# Patient Record
Sex: FEMALE | Race: WHITE | Employment: UNEMPLOYED | URBAN - NONMETROPOLITAN AREA
[De-identification: names, ages, dates, MRNs, and addresses within clinical notes are randomized per-mention and may not be internally consistent; named-entity substitution may affect disease eponyms.]

---

## 2017-05-01 ENCOUNTER — OFFICE VISIT (OUTPATIENT)
Dept: FAMILY MEDICINE CLINIC | Facility: CLINIC | Age: 27
End: 2017-05-01

## 2017-05-01 ENCOUNTER — LAB ENCOUNTER (OUTPATIENT)
Dept: LAB | Age: 27
End: 2017-05-01
Attending: FAMILY MEDICINE
Payer: COMMERCIAL

## 2017-05-01 VITALS
BODY MASS INDEX: 21.14 KG/M2 | SYSTOLIC BLOOD PRESSURE: 110 MMHG | RESPIRATION RATE: 12 BRPM | DIASTOLIC BLOOD PRESSURE: 60 MMHG | TEMPERATURE: 98 F | HEART RATE: 60 BPM | WEIGHT: 125.38 LBS | HEIGHT: 64.5 IN

## 2017-05-01 DIAGNOSIS — M79.602 PARESTHESIA AND PAIN OF BOTH UPPER EXTREMITIES: ICD-10-CM

## 2017-05-01 DIAGNOSIS — R20.2 PARESTHESIA AND PAIN OF BOTH UPPER EXTREMITIES: ICD-10-CM

## 2017-05-01 DIAGNOSIS — M79.601 PARESTHESIA AND PAIN OF BOTH UPPER EXTREMITIES: ICD-10-CM

## 2017-05-01 DIAGNOSIS — Z00.00 ROUTINE HEALTH MAINTENANCE: ICD-10-CM

## 2017-05-01 DIAGNOSIS — Z00.00 ROUTINE HEALTH MAINTENANCE: Primary | ICD-10-CM

## 2017-05-01 PROCEDURE — 80053 COMPREHEN METABOLIC PANEL: CPT

## 2017-05-01 PROCEDURE — 36415 COLL VENOUS BLD VENIPUNCTURE: CPT

## 2017-05-01 PROCEDURE — 99395 PREV VISIT EST AGE 18-39: CPT | Performed by: FAMILY MEDICINE

## 2017-05-01 PROCEDURE — 98925 OSTEOPATH MANJ 1-2 REGIONS: CPT | Performed by: FAMILY MEDICINE

## 2017-05-01 PROCEDURE — 80061 LIPID PANEL: CPT

## 2017-05-01 PROCEDURE — 84443 ASSAY THYROID STIM HORMONE: CPT

## 2017-05-01 PROCEDURE — 85025 COMPLETE CBC W/AUTO DIFF WBC: CPT

## 2017-05-01 NOTE — PROGRESS NOTES
Aileen aHrrison is a 27year old female. Patient presents with:  CPX: No PAP - inrm 8      HPI:   Px  Complaints of bilateral hand numbness and discomfort at night. Without trauma.     Current Outpatient Prescriptions on File Prior to Visit:  [id number] Encounter  Comp Metabolic Panel (14) [E]  Lipid Panel [E]  TSH [E]  CBC W Differential W Platelet [E]    Meds & Refills for this Visit:  No prescriptions requested or ordered in this encounter    Imaging & Consults:  None

## 2017-05-01 NOTE — PATIENT INSTRUCTIONS
Breathing, massage thenar eminence and brachial radialis bilateral.  Call with laboratory studies. Follow-up with GYN as directed. Call with questions or problems.

## 2017-05-17 PROCEDURE — 88175 CYTOPATH C/V AUTO FLUID REDO: CPT | Performed by: OBSTETRICS & GYNECOLOGY

## 2017-05-17 PROCEDURE — 87491 CHLMYD TRACH DNA AMP PROBE: CPT | Performed by: OBSTETRICS & GYNECOLOGY

## 2017-05-17 PROCEDURE — 87591 N.GONORRHOEAE DNA AMP PROB: CPT | Performed by: OBSTETRICS & GYNECOLOGY

## 2017-07-19 ENCOUNTER — OFFICE VISIT (OUTPATIENT)
Dept: FAMILY MEDICINE CLINIC | Facility: CLINIC | Age: 27
End: 2017-07-19

## 2017-07-19 VITALS
DIASTOLIC BLOOD PRESSURE: 88 MMHG | TEMPERATURE: 98 F | SYSTOLIC BLOOD PRESSURE: 120 MMHG | OXYGEN SATURATION: 99 % | HEART RATE: 86 BPM

## 2017-07-19 DIAGNOSIS — G44.209 MUSCLE CONTRACTION HEADACHE: Primary | ICD-10-CM

## 2017-07-19 PROCEDURE — 99213 OFFICE O/P EST LOW 20 MIN: CPT | Performed by: FAMILY MEDICINE

## 2017-07-19 RX ORDER — CYCLOBENZAPRINE HCL 10 MG
10 TABLET ORAL 3 TIMES DAILY PRN
Qty: 15 TABLET | Refills: 0 | Status: SHIPPED | OUTPATIENT
Start: 2017-07-19 | End: 2017-08-08 | Stop reason: WASHOUT

## 2017-07-19 NOTE — PROGRESS NOTES
Aileen Harrison is a 24year old female. Patient presents with: Other: coughing up blood, cold/hot, very thirst, cannot eat--fatigued, headache that started 14 hours ago, not sleeping. ...room 1      HPI:   Patient has multiple complaints.   Her main concern t suspicious lesions  HEENT: atraumatic, normocephalic, R TM normal, L TM normal, Pharynx normal  NECK: supple, no cervical adenopathy  LUNGS: clear to auscultation  CARDIO: RRR without murmur  ABD soft, nontender, normal BS, no masses, rebound or guarding.

## 2017-10-04 ENCOUNTER — OFFICE VISIT (OUTPATIENT)
Dept: FAMILY MEDICINE CLINIC | Facility: CLINIC | Age: 27
End: 2017-10-04

## 2017-10-04 VITALS
HEIGHT: 63.75 IN | SYSTOLIC BLOOD PRESSURE: 110 MMHG | OXYGEN SATURATION: 98 % | BODY MASS INDEX: 21.43 KG/M2 | TEMPERATURE: 98 F | WEIGHT: 124 LBS | DIASTOLIC BLOOD PRESSURE: 60 MMHG | HEART RATE: 75 BPM

## 2017-10-04 DIAGNOSIS — L04.9 LYMPHADENITIS, ACUTE: Primary | ICD-10-CM

## 2017-10-04 DIAGNOSIS — R23.8 PIMPLES: ICD-10-CM

## 2017-10-04 PROCEDURE — 99213 OFFICE O/P EST LOW 20 MIN: CPT | Performed by: FAMILY MEDICINE

## 2017-10-04 NOTE — PROGRESS NOTES
HPI:    Patient ID: Christa Wagner is a 24year old female. Pt last night lump R upper shoulder / neck  + pimple  W/o other problems  HPI    Review of Systems   Constitutional: Negative for chills and fever.             Current Outpatient Prescriptions:  Medr

## 2018-03-26 ENCOUNTER — OFFICE VISIT (OUTPATIENT)
Dept: FAMILY MEDICINE CLINIC | Facility: CLINIC | Age: 28
End: 2018-03-26

## 2018-03-26 VITALS
WEIGHT: 125.38 LBS | HEIGHT: 64.5 IN | SYSTOLIC BLOOD PRESSURE: 104 MMHG | DIASTOLIC BLOOD PRESSURE: 60 MMHG | BODY MASS INDEX: 21.14 KG/M2 | TEMPERATURE: 98 F

## 2018-03-26 DIAGNOSIS — J32.9 SINUSITIS, UNSPECIFIED CHRONICITY, UNSPECIFIED LOCATION: Primary | ICD-10-CM

## 2018-03-26 PROCEDURE — 99213 OFFICE O/P EST LOW 20 MIN: CPT | Performed by: FAMILY MEDICINE

## 2018-03-26 RX ORDER — AZITHROMYCIN 250 MG/1
TABLET, FILM COATED ORAL
Qty: 6 TABLET | Refills: 0 | Status: SHIPPED | OUTPATIENT
Start: 2018-03-26 | End: 2018-05-04 | Stop reason: ALTCHOICE

## 2018-03-26 RX ORDER — PREDNISONE 20 MG/1
20 TABLET ORAL 2 TIMES DAILY
Qty: 10 TABLET | Refills: 0 | Status: SHIPPED | OUTPATIENT
Start: 2018-03-26 | End: 2018-04-02

## 2018-03-26 NOTE — PATIENT INSTRUCTIONS
REST,FLUIDS,ADVIL / TYLENOL PRN fever / body aches  CALL NO CHANGE WORSENING  DISCUSSED WARNING SIGNS  F/U No change 2-3 days  Humidifier / advil C+S

## 2018-03-26 NOTE — PROGRESS NOTES
HPI:    Patient ID: Christa Wagner is a 24year old female. Cough yest  + ST 1 wk  + mj  + fever    HPI    Review of Systems   Constitutional: Positive for chills and fever.    HENT: Positive for congestion, postnasal drip, rhinorrhea, sinus pressure and so

## 2019-03-13 ENCOUNTER — OFFICE VISIT (OUTPATIENT)
Dept: FAMILY MEDICINE CLINIC | Facility: CLINIC | Age: 29
End: 2019-03-13
Payer: COMMERCIAL

## 2019-03-13 VITALS
BODY MASS INDEX: 22.6 KG/M2 | HEIGHT: 64.5 IN | WEIGHT: 134 LBS | SYSTOLIC BLOOD PRESSURE: 116 MMHG | DIASTOLIC BLOOD PRESSURE: 78 MMHG | HEART RATE: 78 BPM | TEMPERATURE: 98 F | OXYGEN SATURATION: 99 %

## 2019-03-13 DIAGNOSIS — J32.9 SINUSITIS, UNSPECIFIED CHRONICITY, UNSPECIFIED LOCATION: Primary | ICD-10-CM

## 2019-03-13 PROCEDURE — 99213 OFFICE O/P EST LOW 20 MIN: CPT | Performed by: FAMILY MEDICINE

## 2019-03-13 RX ORDER — AZITHROMYCIN 250 MG/1
TABLET, FILM COATED ORAL
Qty: 6 TABLET | Refills: 0 | Status: SHIPPED | OUTPATIENT
Start: 2019-03-13 | End: 2019-04-30

## 2019-03-13 RX ORDER — PREDNISONE 20 MG/1
20 TABLET ORAL 2 TIMES DAILY
Qty: 10 TABLET | Refills: 0 | Status: SHIPPED | OUTPATIENT
Start: 2019-03-13 | End: 2019-03-18

## 2019-03-13 NOTE — PROGRESS NOTES
HPI:    Patient ID: Alex Hardin is a 34year old female. X 4 days  + headache / mj / ST  W/o fever  HPI    Review of Systems   Constitutional: Positive for fatigue. Negative for chills and fever.    HENT: Positive for congestion, sinus pressure and sinus MG Oral Tab 10 tablet 0     Sig: Take 1 tablet (20 mg total) by mouth 2 (two) times daily for 5 days. • azithromycin (ZITHROMAX Z-GUERO) 250 MG Oral Tab 6 tablet 0     Sig: Take two tablets by mouth today, then one tablet daily.        Imaging & Referrals:

## 2019-03-13 NOTE — PATIENT INSTRUCTIONS
REST,FLUIDS,ADVIL / TYLENOL PRN fever / body aches  CALL NO CHANGE WORSENING  DISCUSSED WARNING SIGNS  F/U No change 2-3 days  advil C+S

## 2019-06-14 ENCOUNTER — OFFICE VISIT (OUTPATIENT)
Dept: FAMILY MEDICINE CLINIC | Facility: CLINIC | Age: 29
End: 2019-06-14
Payer: COMMERCIAL

## 2019-06-14 VITALS
HEART RATE: 86 BPM | WEIGHT: 136 LBS | OXYGEN SATURATION: 99 % | DIASTOLIC BLOOD PRESSURE: 64 MMHG | SYSTOLIC BLOOD PRESSURE: 104 MMHG | BODY MASS INDEX: 23 KG/M2 | TEMPERATURE: 97 F

## 2019-06-14 DIAGNOSIS — N89.8 VAGINAL DISCHARGE: ICD-10-CM

## 2019-06-14 DIAGNOSIS — N89.8 VAGINAL ITCHING: ICD-10-CM

## 2019-06-14 DIAGNOSIS — B37.3 VULVOVAGINAL CANDIDIASIS: Primary | ICD-10-CM

## 2019-06-14 PROCEDURE — 99213 OFFICE O/P EST LOW 20 MIN: CPT | Performed by: NURSE PRACTITIONER

## 2019-06-14 PROCEDURE — 87491 CHLMYD TRACH DNA AMP PROBE: CPT | Performed by: NURSE PRACTITIONER

## 2019-06-14 PROCEDURE — 87591 N.GONORRHOEAE DNA AMP PROB: CPT | Performed by: NURSE PRACTITIONER

## 2019-06-14 RX ORDER — FLUCONAZOLE 150 MG/1
TABLET ORAL
Qty: 2 TABLET | Refills: 0 | Status: SHIPPED | OUTPATIENT
Start: 2019-06-14 | End: 2019-07-03 | Stop reason: ALTCHOICE

## 2019-06-14 NOTE — PROGRESS NOTES
HPI:   Yeast Infection   The patient's primary symptoms include genital itching, pelvic pain and vaginal discharge (yellow, chunky). The patient's pertinent negatives include no genital lesions, genital odor or genital rash. Episode onset: 3 days ago.  Pe Cardiovascular: Normal rate, regular rhythm and normal heart sounds. Pulmonary/Chest: Effort normal and breath sounds normal. She has no wheezes. Genitourinary: Uterus normal. There is no rash or tenderness on the right labia.  There is no rash or te

## 2019-07-03 ENCOUNTER — TELEPHONE (OUTPATIENT)
Dept: FAMILY MEDICINE CLINIC | Facility: CLINIC | Age: 29
End: 2019-07-03

## 2019-07-03 ENCOUNTER — OFFICE VISIT (OUTPATIENT)
Dept: FAMILY MEDICINE CLINIC | Facility: CLINIC | Age: 29
End: 2019-07-03
Payer: COMMERCIAL

## 2019-07-03 VITALS
BODY MASS INDEX: 22.77 KG/M2 | SYSTOLIC BLOOD PRESSURE: 110 MMHG | WEIGHT: 135 LBS | HEIGHT: 64.5 IN | TEMPERATURE: 97 F | DIASTOLIC BLOOD PRESSURE: 60 MMHG | RESPIRATION RATE: 12 BRPM | HEART RATE: 80 BPM

## 2019-07-03 DIAGNOSIS — S90.111A CONTUSION OF RIGHT GREAT TOE WITHOUT DAMAGE TO NAIL, INITIAL ENCOUNTER: Primary | ICD-10-CM

## 2019-07-03 PROCEDURE — 99213 OFFICE O/P EST LOW 20 MIN: CPT | Performed by: FAMILY MEDICINE

## 2019-07-03 NOTE — TELEPHONE ENCOUNTER
Patient states on Saturday she dropped a glass bottle on her great toe (the bottle did not break). After that she had swelling and bruising. Since then the bruising has resolved. Now complains of palpable bump and pain with palpation only.  No pain weightbe

## 2019-07-03 NOTE — PROGRESS NOTES
HPI:    Patient ID: Tyler Schwab is a 34year old female. Sat dropped beer on R big toe. Intially swelling / bruising  Able to move w/o difficulty. w/o limp    HPI    Review of Systems           Current Outpatient Medications:  [facility] [address]-

## 2019-07-30 ENCOUNTER — TELEPHONE (OUTPATIENT)
Dept: FAMILY MEDICINE CLINIC | Facility: CLINIC | Age: 29
End: 2019-07-30

## 2019-07-30 DIAGNOSIS — B37.3 VULVOVAGINAL CANDIDIASIS: Primary | ICD-10-CM

## 2019-07-30 RX ORDER — FLUCONAZOLE 200 MG/1
TABLET ORAL
Qty: 2 TABLET | Refills: 0 | Status: SHIPPED | OUTPATIENT
Start: 2019-07-30 | End: 2019-09-05

## 2019-07-30 NOTE — TELEPHONE ENCOUNTER
Is very concerned that she has yeast infection symptoms again, within 1 month of each episode. Has vaginal discharge and itching for 2 days, \"just like before\"    Needs to know what is going on, why this is happening. She needs some answers.   She saw n

## 2019-07-30 NOTE — TELEPHONE ENCOUNTER
YEAST INFECTION FOR 2 DAYS. SHE HAD ONE A MONTH AGO. WANTS TO SPEAK WITH NURSE, CONCERNED IT IS REOCCURRING.  PLEASE CALL

## 2019-07-30 NOTE — TELEPHONE ENCOUNTER
Patient instructed; diflucan ordered. She will keep appt tomorrow to discuss further with Dr Alana Avila.

## 2019-07-30 NOTE — TELEPHONE ENCOUNTER
Cotton underwear  Urine after intercourse  Probiotics daily  Stay out of sweaty under garments   F/u if cont problems

## 2019-07-31 ENCOUNTER — OFFICE VISIT (OUTPATIENT)
Dept: FAMILY MEDICINE CLINIC | Facility: CLINIC | Age: 29
End: 2019-07-31
Payer: COMMERCIAL

## 2019-07-31 VITALS
SYSTOLIC BLOOD PRESSURE: 98 MMHG | OXYGEN SATURATION: 97 % | BODY MASS INDEX: 23.22 KG/M2 | HEIGHT: 64 IN | RESPIRATION RATE: 16 BRPM | HEART RATE: 77 BPM | WEIGHT: 136 LBS | TEMPERATURE: 98 F | DIASTOLIC BLOOD PRESSURE: 76 MMHG

## 2019-07-31 DIAGNOSIS — F43.29 STRESS AND ADJUSTMENT REACTION: ICD-10-CM

## 2019-07-31 DIAGNOSIS — B37.3 VULVOVAGINAL CANDIDIASIS: Primary | ICD-10-CM

## 2019-07-31 DIAGNOSIS — G44.219 FREQUENT EPISODIC TENSION-TYPE HEADACHE: ICD-10-CM

## 2019-07-31 PROCEDURE — 99214 OFFICE O/P EST MOD 30 MIN: CPT | Performed by: FAMILY MEDICINE

## 2019-07-31 NOTE — PATIENT INSTRUCTIONS
Massage jaw / temples - advil / aleve  Stress modification  meds as directed - no sweaty clothing / cotton underwear / probiotic daily  Recurrent - GYNE  Call ?  Or problems

## 2019-07-31 NOTE — PROGRESS NOTES
HPI:    Patient ID: Ricci Elena is a 34year old female. Pt vaginal discharge  Increase stress  Sweating at work  Headaches - freq  bandlike    HPI    Review of Systems   Constitutional: Negative for chills and fever.    HENT: Negative for congestion,

## 2019-09-05 ENCOUNTER — TELEPHONE (OUTPATIENT)
Dept: FAMILY MEDICINE CLINIC | Facility: CLINIC | Age: 29
End: 2019-09-05

## 2019-09-05 DIAGNOSIS — B37.3 VULVOVAGINAL CANDIDIASIS: ICD-10-CM

## 2019-09-05 RX ORDER — FLUCONAZOLE 200 MG/1
TABLET ORAL
Qty: 2 TABLET | Refills: 0 | Status: SHIPPED | OUTPATIENT
Start: 2019-09-05 | End: 2020-09-25 | Stop reason: ALTCHOICE

## 2020-10-02 PROBLEM — R87.613 HGSIL (HIGH GRADE SQUAMOUS INTRAEPITHELIAL LESION) ON PAP SMEAR OF CERVIX: Status: ACTIVE | Noted: 2020-10-02

## 2020-10-30 PROCEDURE — 88305 TISSUE EXAM BY PATHOLOGIST: CPT | Performed by: OBSTETRICS & GYNECOLOGY

## 2020-10-30 PROCEDURE — 88342 IMHCHEM/IMCYTCHM 1ST ANTB: CPT | Performed by: OBSTETRICS & GYNECOLOGY

## 2020-11-02 ENCOUNTER — TELEPHONE (OUTPATIENT)
Dept: FAMILY MEDICINE CLINIC | Facility: CLINIC | Age: 30
End: 2020-11-02

## 2020-11-02 NOTE — TELEPHONE ENCOUNTER
Patient asking for appt with Dr Ernestina Marcus, tomorrow OK, to get evaluation on \"migraine\" headache.  shehas been getting them for a long time. Last one from today about 1 month ago.   Not related to period, she is on \"heavy duty birthcontrol,\"  Only has 1 p

## 2020-11-03 ENCOUNTER — OFFICE VISIT (OUTPATIENT)
Dept: FAMILY MEDICINE CLINIC | Facility: CLINIC | Age: 30
End: 2020-11-03
Payer: COMMERCIAL

## 2020-11-03 VITALS
RESPIRATION RATE: 16 BRPM | SYSTOLIC BLOOD PRESSURE: 120 MMHG | HEART RATE: 80 BPM | BODY MASS INDEX: 23.73 KG/M2 | DIASTOLIC BLOOD PRESSURE: 68 MMHG | TEMPERATURE: 98 F | WEIGHT: 139 LBS | HEIGHT: 64 IN

## 2020-11-03 DIAGNOSIS — G44.219 FREQUENT EPISODIC TENSION-TYPE HEADACHE: Primary | ICD-10-CM

## 2020-11-03 DIAGNOSIS — F43.29 STRESS AND ADJUSTMENT REACTION: ICD-10-CM

## 2020-11-03 DIAGNOSIS — M54.2 NECK PAIN: ICD-10-CM

## 2020-11-03 PROCEDURE — 98926 OSTEOPATH MANJ 3-4 REGIONS: CPT | Performed by: FAMILY MEDICINE

## 2020-11-03 PROCEDURE — 99072 ADDL SUPL MATRL&STAF TM PHE: CPT | Performed by: FAMILY MEDICINE

## 2020-11-03 PROCEDURE — 3078F DIAST BP <80 MM HG: CPT | Performed by: FAMILY MEDICINE

## 2020-11-03 PROCEDURE — 3074F SYST BP LT 130 MM HG: CPT | Performed by: FAMILY MEDICINE

## 2020-11-03 PROCEDURE — 99214 OFFICE O/P EST MOD 30 MIN: CPT | Performed by: FAMILY MEDICINE

## 2020-11-03 PROCEDURE — 3008F BODY MASS INDEX DOCD: CPT | Performed by: FAMILY MEDICINE

## 2020-11-03 RX ORDER — BUTALBITAL, ACETAMINOPHEN AND CAFFEINE 50; 325; 40 MG/1; MG/1; MG/1
1 TABLET ORAL EVERY 4 HOURS PRN
Qty: 30 TABLET | Refills: 0 | Status: SHIPPED | OUTPATIENT
Start: 2020-11-03

## 2020-11-03 RX ORDER — AMITRIPTYLINE HYDROCHLORIDE 25 MG/1
25 TABLET, FILM COATED ORAL NIGHTLY
Qty: 30 TABLET | Refills: 1 | Status: SHIPPED | OUTPATIENT
Start: 2020-11-03 | End: 2021-01-25

## 2020-11-03 NOTE — PROGRESS NOTES
HPI:    Patient ID: German Calderon is a 31year old female. Worsened migraines this yr / occas aura  Episodes q 6 wks  No pattern  W/o grinding  Sleep - OK  Stress high  Top of head  + vomiting associated with bad headaches.   HPI    Review of Systems   C adjustment reaction    No orders of the defined types were placed in this encounter.       Meds This Visit:  Requested Prescriptions     Signed Prescriptions Disp Refills   • Amitriptyline HCl 25 MG Oral Tab 30 tablet 1     Sig: Take 1 tablet (25 mg total)

## 2020-11-20 PROCEDURE — 88307 TISSUE EXAM BY PATHOLOGIST: CPT | Performed by: OBSTETRICS & GYNECOLOGY

## 2020-11-20 PROCEDURE — 88305 TISSUE EXAM BY PATHOLOGIST: CPT | Performed by: OBSTETRICS & GYNECOLOGY

## 2021-01-25 ENCOUNTER — OFFICE VISIT (OUTPATIENT)
Dept: FAMILY MEDICINE CLINIC | Facility: CLINIC | Age: 31
End: 2021-01-25
Payer: COMMERCIAL

## 2021-01-25 VITALS
DIASTOLIC BLOOD PRESSURE: 70 MMHG | TEMPERATURE: 99 F | WEIGHT: 142 LBS | BODY MASS INDEX: 24.24 KG/M2 | RESPIRATION RATE: 16 BRPM | SYSTOLIC BLOOD PRESSURE: 130 MMHG | HEIGHT: 64 IN

## 2021-01-25 DIAGNOSIS — M54.2 NECK PAIN: ICD-10-CM

## 2021-01-25 DIAGNOSIS — G44.219 FREQUENT EPISODIC TENSION-TYPE HEADACHE: ICD-10-CM

## 2021-01-25 DIAGNOSIS — F43.29 STRESS AND ADJUSTMENT REACTION: ICD-10-CM

## 2021-01-25 DIAGNOSIS — S29.012A STRAIN OF MUSCLE AND TENDON OF BACK WALL OF THORAX, INITIAL ENCOUNTER: Primary | ICD-10-CM

## 2021-01-25 PROCEDURE — 99214 OFFICE O/P EST MOD 30 MIN: CPT | Performed by: FAMILY MEDICINE

## 2021-01-25 PROCEDURE — 3078F DIAST BP <80 MM HG: CPT | Performed by: FAMILY MEDICINE

## 2021-01-25 PROCEDURE — 3075F SYST BP GE 130 - 139MM HG: CPT | Performed by: FAMILY MEDICINE

## 2021-01-25 PROCEDURE — 3008F BODY MASS INDEX DOCD: CPT | Performed by: FAMILY MEDICINE

## 2021-01-25 RX ORDER — AMITRIPTYLINE HYDROCHLORIDE 25 MG/1
12.5 TABLET, FILM COATED ORAL NIGHTLY
Qty: 30 TABLET | Refills: 1 | Status: SHIPPED | OUTPATIENT
Start: 2021-01-25 | End: 2021-07-23

## 2021-01-25 NOTE — PROGRESS NOTES
HPI:    Patient ID: Rosaline Gitelman is a 31year old female. X Friday shoulder pain  Post R shoulder blade  W/o trauma  Neck tight  Stress high work / career    HPI    Review of Systems   Respiratory: Negative for cough and shortness of breath.     Cardiov Stress and adjustment reaction    No orders of the defined types were placed in this encounter.       Meds This Visit:  Requested Prescriptions     Signed Prescriptions Disp Refills   • Amitriptyline HCl 25 MG Oral Tab 30 tablet 1     Sig: Take 0.5 tablets

## 2021-02-12 ENCOUNTER — TELEPHONE (OUTPATIENT)
Dept: FAMILY MEDICINE CLINIC | Facility: CLINIC | Age: 31
End: 2021-02-12

## 2021-02-12 DIAGNOSIS — R51.9 NONINTRACTABLE HEADACHE, UNSPECIFIED CHRONICITY PATTERN, UNSPECIFIED HEADACHE TYPE: ICD-10-CM

## 2021-02-12 DIAGNOSIS — S29.012A STRAIN OF MUSCLE AND TENDON OF BACK WALL OF THORAX, INITIAL ENCOUNTER: Primary | ICD-10-CM

## 2021-02-12 RX ORDER — CYCLOBENZAPRINE HCL 10 MG
10 TABLET ORAL 3 TIMES DAILY PRN
Qty: 30 TABLET | Refills: 0 | Status: SHIPPED | OUTPATIENT
Start: 2021-02-12 | End: 2021-03-04

## 2021-02-12 NOTE — TELEPHONE ENCOUNTER
Patient has bad headache and unable to move right shoulder. Wanting appt for today. Has missed to much work. Scheduled appt for 8 am 2/15/2021. Instructed to take aleve 2 tab po BID, use ice, and Dr Rayo Madrid is ordering a muscle relaxant for her.   jai/thien

## 2021-02-12 NOTE — TELEPHONE ENCOUNTER
SHE WOKE UP THIS MORNING AND SHE CAN'T MOVE HER ARM.  SHE IS ASKING TO SEE DR Mireya Simental AND SHE IS CRYING

## 2021-02-15 ENCOUNTER — OFFICE VISIT (OUTPATIENT)
Dept: FAMILY MEDICINE CLINIC | Facility: CLINIC | Age: 31
End: 2021-02-15
Payer: COMMERCIAL

## 2021-02-15 VITALS
DIASTOLIC BLOOD PRESSURE: 80 MMHG | BODY MASS INDEX: 23.9 KG/M2 | HEIGHT: 64 IN | SYSTOLIC BLOOD PRESSURE: 110 MMHG | RESPIRATION RATE: 16 BRPM | TEMPERATURE: 98 F | OXYGEN SATURATION: 98 % | HEART RATE: 76 BPM | WEIGHT: 140 LBS

## 2021-02-15 DIAGNOSIS — M25.511 ACUTE PAIN OF RIGHT SHOULDER: ICD-10-CM

## 2021-02-15 DIAGNOSIS — G44.219 FREQUENT EPISODIC TENSION-TYPE HEADACHE: Primary | ICD-10-CM

## 2021-02-15 DIAGNOSIS — M54.2 NECK PAIN: ICD-10-CM

## 2021-02-15 PROCEDURE — 3008F BODY MASS INDEX DOCD: CPT | Performed by: FAMILY MEDICINE

## 2021-02-15 PROCEDURE — 3079F DIAST BP 80-89 MM HG: CPT | Performed by: FAMILY MEDICINE

## 2021-02-15 PROCEDURE — 99214 OFFICE O/P EST MOD 30 MIN: CPT | Performed by: FAMILY MEDICINE

## 2021-02-15 PROCEDURE — 3074F SYST BP LT 130 MM HG: CPT | Performed by: FAMILY MEDICINE

## 2021-02-15 PROCEDURE — 98925 OSTEOPATH MANJ 1-2 REGIONS: CPT | Performed by: FAMILY MEDICINE

## 2021-02-15 RX ORDER — MELOXICAM 15 MG/1
15 TABLET ORAL DAILY
Qty: 15 TABLET | Refills: 3 | Status: SHIPPED | OUTPATIENT
Start: 2021-02-15

## 2021-02-15 NOTE — PROGRESS NOTES
HPI:    Patient ID: Emmy Reeves is a 31year old female. Headache thurs  R shoulder pain  Went to ER  + stress w/ work  More freq headaches    HPI    Review of Systems   Gastrointestinal: Negative. Genitourinary: Negative.     Musculoskeletal: Posit /80   Pulse 76   Temp 97.6 °F (36.4 °C) (Temporal)   Resp 16   Ht 5' 4\" (1.626 m)   Wt 140 lb (63.5 kg)   SpO2 98%   BMI 24.03 kg/m²              ASSESSMENT/PLAN:   Neck pain  Acute pain of right shoulder  Frequent episodic tension-type headache  (p

## 2021-02-15 NOTE — PATIENT INSTRUCTIONS
rowing exersise,alt-ice/heat,avoid neck exersises  CORE STRENGTHENING  Discussed neutral neck position

## 2021-03-01 ENCOUNTER — TELEPHONE (OUTPATIENT)
Dept: FAMILY MEDICINE CLINIC | Facility: CLINIC | Age: 31
End: 2021-03-01

## 2021-03-01 NOTE — TELEPHONE ENCOUNTER
She said she is still waiting for a call from you letting her know that she can now call to schedule her pt appointment

## 2021-03-04 ENCOUNTER — TELEPHONE (OUTPATIENT)
Dept: PHYSICAL THERAPY | Facility: HOSPITAL | Age: 31
End: 2021-03-04

## 2021-03-08 ENCOUNTER — OFFICE VISIT (OUTPATIENT)
Dept: PHYSICAL THERAPY | Age: 31
End: 2021-03-08
Attending: FAMILY MEDICINE
Payer: COMMERCIAL

## 2021-03-08 DIAGNOSIS — G44.219 FREQUENT EPISODIC TENSION-TYPE HEADACHE: ICD-10-CM

## 2021-03-08 DIAGNOSIS — M54.2 NECK PAIN: ICD-10-CM

## 2021-03-08 DIAGNOSIS — M25.511 ACUTE PAIN OF RIGHT SHOULDER: ICD-10-CM

## 2021-03-08 PROCEDURE — 97110 THERAPEUTIC EXERCISES: CPT

## 2021-03-08 PROCEDURE — 97162 PT EVAL MOD COMPLEX 30 MIN: CPT

## 2021-03-09 NOTE — PROGRESS NOTES
CERVICAL SPINE EVALUATION:   Referring Provider: Jass Hanks DO  [address] [place], [place] [address]   Diagnosis: Neck pain (M54.2); Acute pain of right shoulder (M25.511);  Frequent episodic tension-type headache (G44.219)     Date of Service cervical hypomobility leading to headaches at least partially cervicogenic in nature.   Throughout session, patient reported she did not feel physical therapy was needed; we discussed at length the effective role PT + HEP can play in reducing, eliminating, and outcome measures, this evaluation involved Moderate Complexity decision making due to 1-2 personal factors/comorbidities, 3 body structures involved/activity limitations, and evolving symptoms including progressively worsening symptoms with multiple re required: Yes  I certify the need for these services furnished under this plan of treatment and while under my care.     X___________________________________________________ Date____________________    Certification From: 6/9/9285  To: 6/6/2021

## 2021-03-15 ENCOUNTER — OFFICE VISIT (OUTPATIENT)
Dept: PHYSICAL THERAPY | Age: 31
End: 2021-03-15
Attending: FAMILY MEDICINE
Payer: COMMERCIAL

## 2021-03-15 PROCEDURE — 97140 MANUAL THERAPY 1/> REGIONS: CPT

## 2021-03-15 PROCEDURE — 97110 THERAPEUTIC EXERCISES: CPT

## 2021-03-15 NOTE — PROGRESS NOTES
Dx: Neck pain (M54.2); Acute pain of right shoulder (M25.511);  Frequent episodic tension-type headache (G44.219)          Insurance (Authorized # of Visits):  [id number] Provider: Dr. Krzysztof Bosch MD/DO/PA/NP visit: None scheduled   Pre Tx#: 5/ Date:    Tx#: 6/   Roberto x15'  Prone P-As (central R unilateral) grade II-III       TherEx  Supine cervical rotations x10  Supine B scap retract, depression x10ea  Supine chin tucks (no lift) x10/5\" hold  Supine HABD 3x10/YTB  UT stretch wi

## 2021-03-22 ENCOUNTER — OFFICE VISIT (OUTPATIENT)
Dept: PHYSICAL THERAPY | Age: 31
End: 2021-03-22
Attending: FAMILY MEDICINE
Payer: COMMERCIAL

## 2021-03-22 PROCEDURE — 97140 MANUAL THERAPY 1/> REGIONS: CPT

## 2021-03-22 PROCEDURE — 97110 THERAPEUTIC EXERCISES: CPT

## 2021-03-22 NOTE — PROGRESS NOTES
Dx: Neck pain (M54.2); Acute pain of right shoulder (M25.511);  Frequent episodic tension-type headache (G44.219)          Insurance (Authorized # of Visits):  [id number] Provider: Dr. Saeid Bosch MD/DO/PA/NP visit: None scheduled   Pre of 16 visits over a 90 day period    Date: 3/15/2021  Tx#: 2/16 Date: 3/22/2021             Tx#: 3/16 Date: Tx#: 4/ Date:    Tx#: 5/   ManTher x15'  Prone P-As (central R unilateral) grade II-III ManTher x15'  Prone P-As (central R unilatera

## 2021-04-02 ENCOUNTER — OFFICE VISIT (OUTPATIENT)
Dept: PHYSICAL THERAPY | Age: 31
End: 2021-04-02
Attending: FAMILY MEDICINE
Payer: COMMERCIAL

## 2021-04-02 PROCEDURE — 97140 MANUAL THERAPY 1/> REGIONS: CPT

## 2021-04-02 PROCEDURE — 97110 THERAPEUTIC EXERCISES: CPT

## 2021-04-02 NOTE — PROGRESS NOTES
Dx: Neck pain (M54.2); Acute pain of right shoulder (M25.511);  Frequent episodic tension-type headache (G44.219)          Insurance (Authorized # of Visits):  [facility] Provider: Dr. Marge Bosch MD/DO/PA/NP visit: None scheduled  Voca turning head to check blind spot while driving  · Pt will report decreased frequency of headaches to <5x/week  --  GOAL MET (4/2/2021)  · Pt will demonstrate improved cervical intrinsic strength to allow improved cervical stabilization with lifting >30# as Treatment Time: 40 min

## 2021-04-09 ENCOUNTER — OFFICE VISIT (OUTPATIENT)
Dept: PHYSICAL THERAPY | Age: 31
End: 2021-04-09
Attending: FAMILY MEDICINE
Payer: COMMERCIAL

## 2021-04-09 PROCEDURE — 97110 THERAPEUTIC EXERCISES: CPT

## 2021-04-09 PROCEDURE — 97140 MANUAL THERAPY 1/> REGIONS: CPT

## 2021-04-09 NOTE — PROGRESS NOTES
Dx: Neck pain (M54.2); Acute pain of right shoulder (M25.511);  Frequent episodic tension-type headache (G44.219)          Insurance (Authorized # of Visits):  [facility] Provider: Dr. Jasmine Bosch MD/DO/PA/NP visit: None scheduled  Voca extension ROM to improve tolerance for putting dishes into overhead cabinets  --  GOAL MET (4/2/2021)  · Pt will demonstrate full, pain-free B rotation ROM to improve tolerance for turning head to check blind spot while driving  · Pt will report decreased rotation x15  Isometric cervical extension into ball x10/3\" hold  RTB rows, HABD, extension x10ea  Median nerve slider x10 TherEx  UBE 3'/3'  Supine cervical rotations x10  Supine B scap retract, depression x10ea  Supine chin tucks (no lift) 3x30\"  UT st

## 2021-04-12 ENCOUNTER — OFFICE VISIT (OUTPATIENT)
Dept: PHYSICAL THERAPY | Age: 31
End: 2021-04-12
Attending: FAMILY MEDICINE
Payer: COMMERCIAL

## 2021-04-12 PROCEDURE — 97110 THERAPEUTIC EXERCISES: CPT

## 2021-04-12 PROCEDURE — 97140 MANUAL THERAPY 1/> REGIONS: CPT

## 2021-04-12 NOTE — PROGRESS NOTES
Dx: Neck pain (M54.2); Acute pain of right shoulder (M25.511);  Frequent episodic tension-type headache (G44.219)          Insurance (Authorized # of Visits):  [facility] Provider: Dr. Brenda Bosch MD/DO/PA/NP visit: None scheduled  Voca full, pain-free B rotation ROM to improve tolerance for turning head to check blind spot while driving  · Pt will report decreased frequency of headaches to <5x/week  --  GOAL MET (4/2/2021)  · Pt will demonstrate improved cervical intrinsic strength to al 3-finger rotation x15  Isometric cervical extension into ball x10/3\" hold  RTB rows, HABD, extension x10ea  Median nerve slider x10 TherEx  UBE 3'/3'  Supine cervical rotations x10  Supine B scap retract, depression x10ea  Supine chin tucks (no lift) 3x

## 2021-04-16 ENCOUNTER — OFFICE VISIT (OUTPATIENT)
Dept: PHYSICAL THERAPY | Age: 31
End: 2021-04-16
Attending: FAMILY MEDICINE
Payer: COMMERCIAL

## 2021-04-16 PROCEDURE — 97140 MANUAL THERAPY 1/> REGIONS: CPT

## 2021-04-16 PROCEDURE — 97110 THERAPEUTIC EXERCISES: CPT

## 2021-04-16 NOTE — PROGRESS NOTES
Dx: Neck pain (M54.2); Acute pain of right shoulder (M25.511);  Frequent episodic tension-type headache (G44.219)          Insurance (Authorized # of Visits):  [facility] Provider: Dr. Zuri Bosch MD/DO/PA/NP visit: None scheduled  Voca pain-free B rotation ROM to improve tolerance for turning head to check blind spot while driving  · Pt will report decreased frequency of headaches to <5x/week  --  GOAL MET (4/2/2021)  · Pt will demonstrate improved cervical intrinsic strength to allow im extension into ball x10/3\" hold  RTB rows, HABD, extension x10ea  Median nerve slider x10 TherEx  UBE 3'/3'  Supine cervical rotations x10  Supine B scap retract, depression x10ea  Supine chin tucks (no lift) 3x30\"  Supine chin tuck + lift 3x10\"  UT str

## 2021-04-19 ENCOUNTER — OFFICE VISIT (OUTPATIENT)
Dept: PHYSICAL THERAPY | Age: 31
End: 2021-04-19
Attending: FAMILY MEDICINE
Payer: COMMERCIAL

## 2021-04-19 PROCEDURE — 97110 THERAPEUTIC EXERCISES: CPT

## 2021-04-19 PROCEDURE — 97140 MANUAL THERAPY 1/> REGIONS: CPT

## 2021-04-19 NOTE — PROGRESS NOTES
Dx: Neck pain (M54.2); Acute pain of right shoulder (M25.511);  Frequent episodic tension-type headache (G44.219)          Insurance (Authorized # of Visits):  [facility] Provider: Dr. Zuri Bosch MD/DO/PA/NP visit: None scheduled  Voca will demonstrate full, pain-free B rotation ROM to improve tolerance for turning head to check blind spot while driving  · Pt will report decreased frequency of headaches to <5x/week  --  GOAL MET (4/2/2021)  · Pt will demonstrate improved cervical intrins x10ea  Supine chin tucks (no lift) 3x30\"  Supine chin tuck + lift 3x10\"  UT stretch with flexion bias 3x30\" for R UT only   3-finger rotation x15  Isometric cervical extension into ball x10/3\" hold  RTB rows, HABD, extension x10ea  Median nerve slider

## 2021-04-23 ENCOUNTER — OFFICE VISIT (OUTPATIENT)
Dept: PHYSICAL THERAPY | Age: 31
End: 2021-04-23
Attending: FAMILY MEDICINE
Payer: COMMERCIAL

## 2021-04-23 PROCEDURE — 97110 THERAPEUTIC EXERCISES: CPT

## 2021-04-23 PROCEDURE — 97140 MANUAL THERAPY 1/> REGIONS: CPT

## 2021-04-23 NOTE — PROGRESS NOTES
Dx: Neck pain (M54.2); Acute pain of right shoulder (M25.511);  Frequent episodic tension-type headache (G44.219)          Insurance (Authorized # of Visits):  [facility] Provider: Dr. Ernestina Bosch MD/DO/PA/NP visit: None scheduled  Voca work  · Pt will be independent and compliant with comprehensive HEP to maintain progress achieved in PT     FREQUENCY: Patient will be seen for 2 x/week or a total of 16 visits over a 90 day period    Date: 4/2/2021              Tx#: 4/16 Date: 4/9/2021  T into ball x10/3\" hold  RTB rows, HABD, extension x10ea  Median nerve slider x10 TherEx  UBE 3'/3'  Supine cervical rotations x10  Supine B scap retract, depression x10ea  Supine chin tucks (no lift) 3x30\"  Supine chin tuck + lift 3x10\"  UT stretch with

## 2021-05-17 ENCOUNTER — APPOINTMENT (OUTPATIENT)
Dept: PHYSICAL THERAPY | Age: 31
End: 2021-05-17
Attending: FAMILY MEDICINE
Payer: COMMERCIAL

## 2021-05-20 ENCOUNTER — APPOINTMENT (OUTPATIENT)
Dept: PHYSICAL THERAPY | Age: 31
End: 2021-05-20
Attending: FAMILY MEDICINE
Payer: COMMERCIAL

## 2021-05-24 ENCOUNTER — OFFICE VISIT (OUTPATIENT)
Dept: PHYSICAL THERAPY | Age: 31
End: 2021-05-24
Attending: FAMILY MEDICINE
Payer: COMMERCIAL

## 2021-05-24 PROCEDURE — 97140 MANUAL THERAPY 1/> REGIONS: CPT

## 2021-05-24 PROCEDURE — 97110 THERAPEUTIC EXERCISES: CPT

## 2021-05-24 NOTE — PROGRESS NOTES
Dx: Neck pain (M54.2); Acute pain of right shoulder (M25.511);  Frequent episodic tension-type headache (G44.219)          Insurance (Authorized # of Visits):  Latrelle Moritz (3/8/21 - 4/6/21)           Authorizing Provider: Dr. Krzysztof Bosch MD/ visit: None blair strength to allow improved cervical stabilization with lifting >30# as needed for work  · Pt will be independent and compliant with comprehensive HEP to maintain progress achieved in PT     FREQUENCY: Patient will be seen for 2 x/week or a total of 16 visi TherEx  UBE 2:30/2:30  Prone scap setting x10  Supine cervical rotations x10  Supine B scap retract, depression x10ea  Supine chin tucks (no lift) 3x30\"  Supine chin tuck + lift 3x20\"  UT stretch with flexion bias 3x30\" for R UT only   3-finger rotation

## 2021-05-27 ENCOUNTER — APPOINTMENT (OUTPATIENT)
Dept: PHYSICAL THERAPY | Age: 31
End: 2021-05-27
Attending: FAMILY MEDICINE
Payer: COMMERCIAL

## 2021-06-01 ENCOUNTER — OFFICE VISIT (OUTPATIENT)
Dept: PHYSICAL THERAPY | Age: 31
End: 2021-06-01
Attending: FAMILY MEDICINE
Payer: COMMERCIAL

## 2021-06-01 PROCEDURE — 97140 MANUAL THERAPY 1/> REGIONS: CPT

## 2021-06-01 PROCEDURE — 97110 THERAPEUTIC EXERCISES: CPT

## 2021-06-01 NOTE — PROGRESS NOTES
ProgressSummary / POC Re-certification  Pt has attended 11 visits in Physical Therapy. Dx: Neck pain (M54.2); Acute pain of right shoulder (M25.511);  Frequent episodic tension-type headache (G44.219)          Insurance (Authorized # of Visits):  [place] check blind spot while driving  --  GOAL MET (5/24/2021)  · Pt will report decreased frequency of headaches to <5x/week  --  GOAL MET (4/2/2021)  · Pt will demonstrate improved cervical intrinsic strength to allow improved cervical stabilization with lifti 3x30\"  Supine chin tuck + lift 3x10\"  UT stretch with flexion bias 3x30\" for R UT only   3-finger rotation x15  Isometric cervical extension into ball w/scap retraction x10/3\" hold  GTB rows, HABD, extension x10ea  Median nerve slider x10 TherEx  UBE 3 mobs; UT stretch; T-band rows  Charges: Roberto x1; TherEx x2       Total Timed Treatment: 40 min  Total Treatment Time: 40 min

## 2021-06-03 ENCOUNTER — OFFICE VISIT (OUTPATIENT)
Dept: PHYSICAL THERAPY | Age: 31
End: 2021-06-03
Attending: FAMILY MEDICINE
Payer: COMMERCIAL

## 2021-06-03 PROCEDURE — 97110 THERAPEUTIC EXERCISES: CPT

## 2021-06-03 PROCEDURE — 97140 MANUAL THERAPY 1/> REGIONS: CPT

## 2021-06-03 NOTE — PROGRESS NOTES
Dx: Neck pain (M54.2); Acute pain of right shoulder (M25.511);  Frequent episodic tension-type headache (G44.219)          Insurance (Authorized # of Visits):  Therese Combs (6/1/21 - 8/30/21)          Authorizing Provider: Dr. Jacinto Bosch MD/ visit: None blair cervical intrinsic strength to allow improved cervical stabilization with lifting >30# as needed for work  · Pt will be independent and compliant with comprehensive HEP to maintain progress achieved in PT     FREQUENCY: Patient will be seen for 2 x/week or Supine chin tuck; supine cervical rotations; median nerve slider; self-thoracic mobs; UT stretch; T-band rows  Charges: Lindaher x1; TherEx x2       Total Timed Treatment: 40 min  Total Treatment Time: 40 min

## 2021-06-07 ENCOUNTER — OFFICE VISIT (OUTPATIENT)
Dept: PHYSICAL THERAPY | Age: 31
End: 2021-06-07
Attending: FAMILY MEDICINE
Payer: COMMERCIAL

## 2021-06-07 PROCEDURE — 97140 MANUAL THERAPY 1/> REGIONS: CPT

## 2021-06-07 PROCEDURE — 97110 THERAPEUTIC EXERCISES: CPT

## 2021-06-07 NOTE — PROGRESS NOTES
Dx: Neck pain (M54.2); Acute pain of right shoulder (M25.511);  Frequent episodic tension-type headache (G44.219)          Insurance (Authorized # of Visits):  Gregory Abad (6/1/21 - 8/30/21)          Authorizing Provider: Dr. Noa Bosch MD/DO visit: None blair decreased frequency of headaches to <5x/week  --  GOAL MET (4/2/2021)  · Pt will demonstrate improved cervical intrinsic strength to allow improved cervical stabilization with lifting >30# as needed for work  · Pt will be independent and compliant with com 2x10ea  RTB HABD 2x10  RTB ER walk-outs x10  BTB IR walk-outs x10  SA punches into ball x10/3\" hold  Median nerve tensioner x10  Standing UTR x10 R/L with BTB, head still TherEx  UBE 2:30/2:30  S/L ER 3x15/0#  Supine B scap retract, depression x10ea  Supi

## 2021-06-10 ENCOUNTER — OFFICE VISIT (OUTPATIENT)
Dept: PHYSICAL THERAPY | Age: 31
End: 2021-06-10
Attending: FAMILY MEDICINE
Payer: COMMERCIAL

## 2021-06-10 PROCEDURE — 97140 MANUAL THERAPY 1/> REGIONS: CPT

## 2021-06-10 PROCEDURE — 97110 THERAPEUTIC EXERCISES: CPT

## 2021-06-10 NOTE — PROGRESS NOTES
Dx: Neck pain (M54.2); Acute pain of right shoulder (M25.511);  Frequent episodic tension-type headache (G44.219)          Insurance (Authorized # of Visits):  Cruz Jarquin (6/1/21 - 8/30/21)          Authorizing Provider: Dr. Corazon Bosch MD/DO visit: None blair stabilization with lifting >30# as needed for work  · Pt will be independent and compliant with comprehensive HEP to maintain progress achieved in PT     FREQUENCY: Patient will be seen for 2 x/week or a total of 16 visits over a 90 day period.     Date: 5/ x10/3\" hold  Median nerve tensioner x10  Standing UTR x10 R/L with BTB, head still TherEx  UBE 2:30/2:30  S/L ER 3x15/0#  Supine B scap retract, depression x10ea  Supine chin tuck + lift 3x25\"  Posterior cuff stretch 3x30\"  Single-arm alternating LT lif

## 2021-06-25 ENCOUNTER — OFFICE VISIT (OUTPATIENT)
Dept: PHYSICAL THERAPY | Age: 31
End: 2021-06-25
Attending: FAMILY MEDICINE
Payer: COMMERCIAL

## 2021-06-25 PROCEDURE — 97140 MANUAL THERAPY 1/> REGIONS: CPT

## 2021-06-25 PROCEDURE — 97110 THERAPEUTIC EXERCISES: CPT

## 2021-06-25 NOTE — PROGRESS NOTES
Dx: Neck pain (M54.2); Acute pain of right shoulder (M25.511);  Frequent episodic tension-type headache (G44.219)          Insurance (Authorized # of Visits):  Vioelt Yeager (6/1/21 - 8/30/21)          Authorizing Provider: Dr. Alana Bosch MD/DO visit: None blair report decreased frequency of headaches to <5x/week  --  GOAL MET (4/2/2021)  · Pt will demonstrate improved cervical intrinsic strength to allow improved cervical stabilization with lifting >30# as needed for work  · Pt will be independent and compliant w 3x10ea  GTB HABD 2x10  RTB ER walk-outs x10  BTB IR walk-outs x10  SA punches into ball x10/3\" hold     HEP: 6/25/2021: Supine chin tuck; supine cervical rotations; self-thoracic mobs; SCM stretch; T-band rows  Charges: ManTher x1; TherEx x2       Total T

## 2021-07-02 ENCOUNTER — APPOINTMENT (OUTPATIENT)
Dept: PHYSICAL THERAPY | Age: 31
End: 2021-07-02
Attending: FAMILY MEDICINE
Payer: COMMERCIAL

## 2021-07-08 ENCOUNTER — TELEPHONE (OUTPATIENT)
Dept: PHYSICAL THERAPY | Age: 31
End: 2021-07-08

## 2021-07-08 ENCOUNTER — APPOINTMENT (OUTPATIENT)
Dept: PHYSICAL THERAPY | Age: 31
End: 2021-07-08
Attending: FAMILY MEDICINE
Payer: COMMERCIAL

## 2021-07-16 ENCOUNTER — OFFICE VISIT (OUTPATIENT)
Dept: PHYSICAL THERAPY | Age: 31
End: 2021-07-16
Attending: FAMILY MEDICINE
Payer: COMMERCIAL

## 2021-07-16 PROCEDURE — 97110 THERAPEUTIC EXERCISES: CPT

## 2021-07-16 PROCEDURE — 97140 MANUAL THERAPY 1/> REGIONS: CPT

## 2021-07-16 NOTE — PROGRESS NOTES
ProgressSummary  Pt has attended 16 visits in Physical Therapy. Dx: Neck pain (M54.2); Acute pain of right shoulder (M25.511);  Frequent episodic tension-type headache (G44.219)          Insurance (Authorized # of Visits):  Canelo Alvarez (6/1/21 - 8/30/21) pain-free cervical extension ROM to improve tolerance for putting dishes into overhead cabinets  --  GOAL MET (4/2/2021)  · Pt will demonstrate full, pain-free B rotation ROM to improve tolerance for turning head to check blind spot while driving  --  GOAL 3x30\"  Single-arm alternating LT lift-off x10  GTB rows, tameka ER, extension 2x10ea  GTB HABD 2x10  RTB ER walk-outs x10  BTB IR walk-outs x10  SA punches into ball x10/3\" hold  Median nerve tensioner x10  Standing UTR x10 R/L with BTB, head still  NOT PER

## 2021-07-19 ENCOUNTER — TELEPHONE (OUTPATIENT)
Dept: FAMILY MEDICINE CLINIC | Facility: CLINIC | Age: 31
End: 2021-07-19

## 2021-07-19 NOTE — TELEPHONE ENCOUNTER
HAS HAD ISSUES WITH MEDS IN THE PAST, ASKING FOR DR NOTE FOR WORK STATING THIS & THAT IF SHE GETS COVID VACCINE SHE MAY BE OUT OF WORK FOR A FEW DAYS IF SHE GETS SICK FROM IT, CALL PT

## 2021-07-19 NOTE — TELEPHONE ENCOUNTER
Roverto Vides states she has a [place]" allergic reaction to medications. She is interested in getting the COVID vaccine and is confident that she will get sick from it because ALL of her family got sick from the vaccine.  She wants a letter to excuse her from wo

## 2021-07-23 ENCOUNTER — APPOINTMENT (OUTPATIENT)
Dept: PHYSICAL THERAPY | Age: 31
End: 2021-07-23
Attending: FAMILY MEDICINE
Payer: COMMERCIAL

## 2021-07-23 NOTE — PROGRESS NOTES
HPI/Subjective:   Patient ID: Nuala Phalen is a 36year old female. Pt concern w/ Covid shot  ?  Note that may get sick w/ shot  Does not want shot  HPI    History/Other:   Review of Systems  Current Outpatient Medications   Medication Sig Dispense Refi

## 2021-08-04 ENCOUNTER — OFFICE VISIT (OUTPATIENT)
Dept: FAMILY MEDICINE CLINIC | Facility: CLINIC | Age: 31
End: 2021-08-04
Payer: COMMERCIAL

## 2021-08-04 VITALS
RESPIRATION RATE: 16 BRPM | BODY MASS INDEX: 24 KG/M2 | SYSTOLIC BLOOD PRESSURE: 122 MMHG | OXYGEN SATURATION: 98 % | DIASTOLIC BLOOD PRESSURE: 80 MMHG | HEART RATE: 60 BPM | TEMPERATURE: 98 F | WEIGHT: 137.13 LBS

## 2021-08-04 DIAGNOSIS — R59.1 LYMPHADENOPATHY: Primary | ICD-10-CM

## 2021-08-04 PROCEDURE — 3079F DIAST BP 80-89 MM HG: CPT | Performed by: INTERNAL MEDICINE

## 2021-08-04 PROCEDURE — 99213 OFFICE O/P EST LOW 20 MIN: CPT | Performed by: INTERNAL MEDICINE

## 2021-08-04 PROCEDURE — 3074F SYST BP LT 130 MM HG: CPT | Performed by: INTERNAL MEDICINE

## 2021-08-04 NOTE — PROGRESS NOTES
HPI:   Kavita Grubbs is a 36year old female who presents for tenderness in the clavical and axillary after COVID vaccine 1 week ago.      Current Outpatient Medications   Medication Sig Dispense Refill   • Meloxicam 15 MG Oral Tab Take 1 tablet (15 mg to developed, well nourished,in no apparent distress  SKIN: tender left clavicular LAD and left axillary LAD.    EYES:PERRLA, EOMI, normal optic disk,conjunctiva are clear  HEENT: atraumatic, normocephalic,ears and throat are clear  NECK: supple,no adenopathy,

## 2021-08-13 ENCOUNTER — OFFICE VISIT (OUTPATIENT)
Dept: PHYSICAL THERAPY | Age: 31
End: 2021-08-13
Attending: FAMILY MEDICINE
Payer: COMMERCIAL

## 2021-08-13 PROCEDURE — 97110 THERAPEUTIC EXERCISES: CPT

## 2021-08-13 PROCEDURE — 97140 MANUAL THERAPY 1/> REGIONS: CPT

## 2021-08-13 NOTE — PROGRESS NOTES
Dx: Neck pain (M54.2); Acute pain of right shoulder (M25.511);  Frequent episodic tension-type headache (G44.219)          Insurance (Authorized # of Visits):  Mary Soto (7/16/21 - 10/14/21)        Authorizing Provider: Dr. Rush Bosch MD/DO visit: None blair demonstrate full, pain-free cervical extension ROM to improve tolerance for putting dishes into overhead cabinets  --  GOAL MET (4/2/2021)  · Pt will demonstrate full, pain-free B rotation ROM to improve tolerance for turning head to check blind spot while x10  Rhomboids stretch in // 3x30\"  GTB rows, tameka ER, extension 3x10ea  GTB HABD 2x10  RTB ER walk-outs x10  BTB IR walk-outs x10  SA punches into ball x10/3\" hold  NOT PERFORMED:  Prone scap setting x10  UT stretch with flexion bias 3x30\" for R/L  Medi

## 2021-09-20 ENCOUNTER — APPOINTMENT (OUTPATIENT)
Dept: PHYSICAL THERAPY | Age: 31
End: 2021-09-20
Attending: FAMILY MEDICINE
Payer: COMMERCIAL

## 2021-09-23 ENCOUNTER — OFFICE VISIT (OUTPATIENT)
Dept: PHYSICAL THERAPY | Age: 31
End: 2021-09-23
Attending: FAMILY MEDICINE
Payer: COMMERCIAL

## 2021-09-23 PROCEDURE — 97110 THERAPEUTIC EXERCISES: CPT

## 2021-09-23 PROCEDURE — 97140 MANUAL THERAPY 1/> REGIONS: CPT

## 2022-03-22 ENCOUNTER — TELEPHONE (OUTPATIENT)
Dept: FAMILY MEDICINE CLINIC | Facility: CLINIC | Age: 32
End: 2022-03-22

## 2022-03-22 NOTE — TELEPHONE ENCOUNTER
She is asking if she can get another referral for PT downstairs.  She is starting have shoulder pain again and would like to get back into PT.

## 2022-03-22 NOTE — TELEPHONE ENCOUNTER
Patient asks to change doctors, she is concerned Dr Gurdeep Montes won't order physical therapy, but drag it out like he did in the past.  She knows what her body needs and he didn't listen to me. She said was told by physical therapy that all she had to do was call to get order if needed to go back to therapy. Told her this wasn't correct. Discussed with Dr Gurdeep Montes and Dr Magui Carrera.     Dr Cordero said OK as long as OK with Dr Gurdeep Montes

## 2022-03-24 NOTE — TELEPHONE ENCOUNTER
Future Appointments   Date Time Provider Dmitry Del Rosario   3/28/2022  9:30 AM Flako Cordero MD [facility]   3/30/2022  9:30 AM SP NURSE CELSO[facility]  [facility]

## 2022-03-28 ENCOUNTER — OFFICE VISIT (OUTPATIENT)
Dept: FAMILY MEDICINE CLINIC | Facility: CLINIC | Age: 32
End: 2022-03-28
Payer: COMMERCIAL

## 2022-03-28 VITALS
TEMPERATURE: 97 F | HEIGHT: 64.5 IN | HEART RATE: 79 BPM | DIASTOLIC BLOOD PRESSURE: 70 MMHG | BODY MASS INDEX: 22.96 KG/M2 | WEIGHT: 136.13 LBS | SYSTOLIC BLOOD PRESSURE: 102 MMHG | OXYGEN SATURATION: 99 %

## 2022-03-28 DIAGNOSIS — M25.512 ACUTE PAIN OF LEFT SHOULDER: ICD-10-CM

## 2022-03-28 DIAGNOSIS — M62.838 TRAPEZIUS MUSCLE SPASM: ICD-10-CM

## 2022-03-28 DIAGNOSIS — M25.511 ACUTE PAIN OF RIGHT SHOULDER: Primary | ICD-10-CM

## 2022-03-28 PROCEDURE — 3078F DIAST BP <80 MM HG: CPT | Performed by: FAMILY MEDICINE

## 2022-03-28 PROCEDURE — 3074F SYST BP LT 130 MM HG: CPT | Performed by: FAMILY MEDICINE

## 2022-03-28 PROCEDURE — 3008F BODY MASS INDEX DOCD: CPT | Performed by: FAMILY MEDICINE

## 2022-03-28 PROCEDURE — 99213 OFFICE O/P EST LOW 20 MIN: CPT | Performed by: FAMILY MEDICINE

## 2022-04-11 ENCOUNTER — OFFICE VISIT (OUTPATIENT)
Dept: FAMILY MEDICINE CLINIC | Facility: CLINIC | Age: 32
End: 2022-04-11
Payer: COMMERCIAL

## 2022-04-11 VITALS
HEART RATE: 80 BPM | SYSTOLIC BLOOD PRESSURE: 110 MMHG | DIASTOLIC BLOOD PRESSURE: 70 MMHG | OXYGEN SATURATION: 97 % | TEMPERATURE: 98 F

## 2022-04-11 DIAGNOSIS — J39.2 PHARYNGEAL IRRITATION: ICD-10-CM

## 2022-04-11 DIAGNOSIS — J06.9 ACUTE URI: Primary | ICD-10-CM

## 2022-04-11 LAB
CONTROL LINE PRESENT WITH A CLEAR BACKGROUND (YES/NO): YES YES/NO
KIT LOT #: NORMAL NUMERIC
STREP GRP A CUL-SCR: NEGATIVE

## 2022-04-11 PROCEDURE — 87880 STREP A ASSAY W/OPTIC: CPT | Performed by: NURSE PRACTITIONER

## 2022-04-11 PROCEDURE — 3074F SYST BP LT 130 MM HG: CPT | Performed by: NURSE PRACTITIONER

## 2022-04-11 PROCEDURE — 87081 CULTURE SCREEN ONLY: CPT | Performed by: NURSE PRACTITIONER

## 2022-04-11 PROCEDURE — 3078F DIAST BP <80 MM HG: CPT | Performed by: NURSE PRACTITIONER

## 2022-04-11 PROCEDURE — 99213 OFFICE O/P EST LOW 20 MIN: CPT | Performed by: NURSE PRACTITIONER

## 2022-04-12 LAB — SARS-COV-2 RNA RESP QL NAA+PROBE: NOT DETECTED

## 2022-04-21 ENCOUNTER — TELEPHONE (OUTPATIENT)
Dept: PHYSICAL THERAPY | Facility: HOSPITAL | Age: 32
End: 2022-04-21

## 2022-04-22 ENCOUNTER — OFFICE VISIT (OUTPATIENT)
Dept: PHYSICAL THERAPY | Age: 32
End: 2022-04-22
Attending: FAMILY MEDICINE
Payer: COMMERCIAL

## 2022-04-22 DIAGNOSIS — M62.838 TRAPEZIUS MUSCLE SPASM: ICD-10-CM

## 2022-04-22 DIAGNOSIS — M25.512 ACUTE PAIN OF LEFT SHOULDER: ICD-10-CM

## 2022-04-22 DIAGNOSIS — M25.511 ACUTE PAIN OF RIGHT SHOULDER: ICD-10-CM

## 2022-04-22 PROCEDURE — 97140 MANUAL THERAPY 1/> REGIONS: CPT

## 2022-04-22 PROCEDURE — 97110 THERAPEUTIC EXERCISES: CPT

## 2022-04-22 PROCEDURE — 97161 PT EVAL LOW COMPLEX 20 MIN: CPT

## 2022-04-29 ENCOUNTER — OFFICE VISIT (OUTPATIENT)
Dept: PHYSICAL THERAPY | Age: 32
End: 2022-04-29
Attending: FAMILY MEDICINE
Payer: COMMERCIAL

## 2022-04-29 PROCEDURE — 97110 THERAPEUTIC EXERCISES: CPT

## 2022-04-29 PROCEDURE — 97140 MANUAL THERAPY 1/> REGIONS: CPT

## 2022-05-03 ENCOUNTER — APPOINTMENT (OUTPATIENT)
Dept: PHYSICAL THERAPY | Age: 32
End: 2022-05-03
Attending: FAMILY MEDICINE
Payer: COMMERCIAL

## 2022-05-13 ENCOUNTER — APPOINTMENT (OUTPATIENT)
Dept: PHYSICAL THERAPY | Age: 32
End: 2022-05-13
Attending: FAMILY MEDICINE
Payer: COMMERCIAL

## 2022-05-17 ENCOUNTER — OFFICE VISIT (OUTPATIENT)
Dept: PHYSICAL THERAPY | Age: 32
End: 2022-05-17
Attending: FAMILY MEDICINE
Payer: COMMERCIAL

## 2022-05-17 PROCEDURE — 97140 MANUAL THERAPY 1/> REGIONS: CPT

## 2022-05-17 PROCEDURE — 97110 THERAPEUTIC EXERCISES: CPT

## 2022-05-20 ENCOUNTER — TELEPHONE (OUTPATIENT)
Dept: FAMILY MEDICINE CLINIC | Facility: CLINIC | Age: 32
End: 2022-05-20

## 2022-05-20 NOTE — TELEPHONE ENCOUNTER
Patient states she woke up with a stiff shoulder and has seen Dr Mandy Joseph in the past for this. Per Dr. Mandy Joseph ok to write note to be off today and see PT on Monday.

## 2022-05-20 NOTE — TELEPHONE ENCOUNTER
Pt is having bad shoulder pain and needs a note for work. She tried to make a appt for today, no openings today or tomorrow.  Call pt

## 2022-05-23 ENCOUNTER — OFFICE VISIT (OUTPATIENT)
Dept: PHYSICAL THERAPY | Age: 32
End: 2022-05-23
Attending: FAMILY MEDICINE
Payer: COMMERCIAL

## 2022-05-23 PROCEDURE — 97140 MANUAL THERAPY 1/> REGIONS: CPT

## 2022-05-23 PROCEDURE — 97110 THERAPEUTIC EXERCISES: CPT

## 2022-06-06 ENCOUNTER — OFFICE VISIT (OUTPATIENT)
Dept: PHYSICAL THERAPY | Age: 32
End: 2022-06-06
Attending: FAMILY MEDICINE
Payer: COMMERCIAL

## 2022-06-06 PROCEDURE — 97110 THERAPEUTIC EXERCISES: CPT

## 2022-06-06 PROCEDURE — 97140 MANUAL THERAPY 1/> REGIONS: CPT

## 2022-06-17 ENCOUNTER — OFFICE VISIT (OUTPATIENT)
Dept: PHYSICAL THERAPY | Age: 32
End: 2022-06-17
Attending: FAMILY MEDICINE
Payer: COMMERCIAL

## 2022-06-17 PROCEDURE — 97140 MANUAL THERAPY 1/> REGIONS: CPT

## 2022-06-17 PROCEDURE — 97110 THERAPEUTIC EXERCISES: CPT

## 2022-06-24 ENCOUNTER — OFFICE VISIT (OUTPATIENT)
Dept: PHYSICAL THERAPY | Age: 32
End: 2022-06-24
Attending: FAMILY MEDICINE
Payer: COMMERCIAL

## 2022-06-24 PROCEDURE — 97110 THERAPEUTIC EXERCISES: CPT

## 2022-06-24 PROCEDURE — 97140 MANUAL THERAPY 1/> REGIONS: CPT

## 2022-07-12 ENCOUNTER — OFFICE VISIT (OUTPATIENT)
Dept: PHYSICAL THERAPY | Age: 32
End: 2022-07-12
Attending: FAMILY MEDICINE
Payer: COMMERCIAL

## 2022-07-12 PROCEDURE — 97110 THERAPEUTIC EXERCISES: CPT

## 2022-07-12 PROCEDURE — 97140 MANUAL THERAPY 1/> REGIONS: CPT

## 2022-07-19 ENCOUNTER — OFFICE VISIT (OUTPATIENT)
Dept: PHYSICAL THERAPY | Age: 32
End: 2022-07-19
Attending: FAMILY MEDICINE
Payer: COMMERCIAL

## 2022-07-19 PROCEDURE — 97140 MANUAL THERAPY 1/> REGIONS: CPT

## 2022-07-19 PROCEDURE — 97110 THERAPEUTIC EXERCISES: CPT

## 2022-07-26 ENCOUNTER — OFFICE VISIT (OUTPATIENT)
Dept: PHYSICAL THERAPY | Age: 32
End: 2022-07-26
Attending: FAMILY MEDICINE
Payer: COMMERCIAL

## 2022-07-26 PROCEDURE — 97164 PT RE-EVAL EST PLAN CARE: CPT

## 2022-08-02 ENCOUNTER — OFFICE VISIT (OUTPATIENT)
Dept: PHYSICAL THERAPY | Age: 32
End: 2022-08-02
Attending: FAMILY MEDICINE
Payer: COMMERCIAL

## 2022-08-02 PROCEDURE — 97140 MANUAL THERAPY 1/> REGIONS: CPT

## 2022-08-02 PROCEDURE — 97110 THERAPEUTIC EXERCISES: CPT

## 2022-12-03 ENCOUNTER — OFFICE VISIT (OUTPATIENT)
Dept: FAMILY MEDICINE CLINIC | Facility: CLINIC | Age: 32
End: 2022-12-03
Payer: COMMERCIAL

## 2022-12-03 VITALS
BODY MASS INDEX: 23.95 KG/M2 | HEIGHT: 64.5 IN | OXYGEN SATURATION: 97 % | DIASTOLIC BLOOD PRESSURE: 80 MMHG | RESPIRATION RATE: 12 BRPM | WEIGHT: 142 LBS | TEMPERATURE: 97 F | HEART RATE: 89 BPM | SYSTOLIC BLOOD PRESSURE: 120 MMHG

## 2022-12-03 DIAGNOSIS — J02.9 SORE THROAT: Primary | ICD-10-CM

## 2022-12-03 DIAGNOSIS — J01.80 ACUTE NON-RECURRENT SINUSITIS OF OTHER SINUS: ICD-10-CM

## 2022-12-03 DIAGNOSIS — Z20.818 STREPTOCOCCUS GROUP A EXPOSURE: ICD-10-CM

## 2022-12-03 LAB
CONTROL LINE PRESENT WITH A CLEAR BACKGROUND (YES/NO): YES YES/NO
KIT LOT #: 2490 NUMERIC
STREP GRP A CUL-SCR: NEGATIVE

## 2022-12-03 PROCEDURE — 99213 OFFICE O/P EST LOW 20 MIN: CPT | Performed by: FAMILY MEDICINE

## 2022-12-03 PROCEDURE — 87880 STREP A ASSAY W/OPTIC: CPT | Performed by: FAMILY MEDICINE

## 2022-12-03 PROCEDURE — 87081 CULTURE SCREEN ONLY: CPT | Performed by: FAMILY MEDICINE

## 2022-12-03 PROCEDURE — 3079F DIAST BP 80-89 MM HG: CPT | Performed by: FAMILY MEDICINE

## 2022-12-03 PROCEDURE — 3074F SYST BP LT 130 MM HG: CPT | Performed by: FAMILY MEDICINE

## 2022-12-03 PROCEDURE — 3008F BODY MASS INDEX DOCD: CPT | Performed by: FAMILY MEDICINE

## 2022-12-03 RX ORDER — NORGESTIMATE AND ETHINYL ESTRADIOL 0.25-0.035
1 KIT ORAL DAILY
COMMUNITY
Start: 2022-05-24 | End: 2023-08-17

## 2022-12-03 RX ORDER — AMOXICILLIN AND CLAVULANATE POTASSIUM 875; 125 MG/1; MG/1
1 TABLET, FILM COATED ORAL 2 TIMES DAILY
Qty: 20 TABLET | Refills: 0 | Status: SHIPPED | OUTPATIENT
Start: 2022-12-03 | End: 2022-12-03

## 2022-12-03 RX ORDER — AMOXICILLIN AND CLAVULANATE POTASSIUM 875; 125 MG/1; MG/1
1 TABLET, FILM COATED ORAL 2 TIMES DAILY
Qty: 20 TABLET | Refills: 0 | Status: SHIPPED | OUTPATIENT
Start: 2022-12-03 | End: 2022-12-13
